# Patient Record
Sex: FEMALE | Race: WHITE | NOT HISPANIC OR LATINO | Employment: STUDENT | URBAN - METROPOLITAN AREA
[De-identification: names, ages, dates, MRNs, and addresses within clinical notes are randomized per-mention and may not be internally consistent; named-entity substitution may affect disease eponyms.]

---

## 2024-10-15 ENCOUNTER — TELEPHONE (OUTPATIENT)
Dept: OBSTETRICS AND GYNECOLOGY | Facility: CLINIC | Age: 24
End: 2024-10-15
Payer: COMMERCIAL

## 2024-10-15 ENCOUNTER — NURSE TRIAGE (OUTPATIENT)
Dept: ADMINISTRATIVE | Facility: CLINIC | Age: 24
End: 2024-10-15
Payer: COMMERCIAL

## 2024-10-15 ENCOUNTER — OFFICE VISIT (OUTPATIENT)
Dept: FAMILY MEDICINE | Facility: CLINIC | Age: 24
End: 2024-10-15
Payer: COMMERCIAL

## 2024-10-15 VITALS
TEMPERATURE: 99 F | OXYGEN SATURATION: 99 % | WEIGHT: 155.63 LBS | HEART RATE: 83 BPM | SYSTOLIC BLOOD PRESSURE: 100 MMHG | HEIGHT: 66 IN | DIASTOLIC BLOOD PRESSURE: 60 MMHG | BODY MASS INDEX: 25.01 KG/M2

## 2024-10-15 DIAGNOSIS — I82.512 CHRONIC EMBOLISM AND THROMBOSIS OF LEFT FEMORAL VEIN: ICD-10-CM

## 2024-10-15 DIAGNOSIS — N75.1 BARTHOLIN'S GLAND ABSCESS: Primary | ICD-10-CM

## 2024-10-15 PROCEDURE — 99214 OFFICE O/P EST MOD 30 MIN: CPT | Mod: S$GLB,,,

## 2024-10-15 PROCEDURE — 3008F BODY MASS INDEX DOCD: CPT | Mod: CPTII,S$GLB,,

## 2024-10-15 PROCEDURE — 99999 PR PBB SHADOW E&M-EST. PATIENT-LVL IV: CPT | Mod: PBBFAC,,,

## 2024-10-15 PROCEDURE — 3074F SYST BP LT 130 MM HG: CPT | Mod: CPTII,S$GLB,,

## 2024-10-15 PROCEDURE — 3078F DIAST BP <80 MM HG: CPT | Mod: CPTII,S$GLB,,

## 2024-10-15 PROCEDURE — 1159F MED LIST DOCD IN RCRD: CPT | Mod: CPTII,S$GLB,,

## 2024-10-15 PROCEDURE — 1160F RVW MEDS BY RX/DR IN RCRD: CPT | Mod: CPTII,S$GLB,,

## 2024-10-15 RX ORDER — TRAMADOL HYDROCHLORIDE 50 MG/1
50 TABLET ORAL EVERY 8 HOURS PRN
Qty: 21 TABLET | Refills: 0 | Status: SHIPPED | OUTPATIENT
Start: 2024-10-15 | End: 2024-10-22

## 2024-10-15 RX ORDER — SULFAMETHOXAZOLE AND TRIMETHOPRIM 800; 160 MG/1; MG/1
1 TABLET ORAL 2 TIMES DAILY
Qty: 14 TABLET | Refills: 0 | Status: SHIPPED | OUTPATIENT
Start: 2024-10-15 | End: 2024-10-22

## 2024-10-15 NOTE — TELEPHONE ENCOUNTER
Pt stated she has a cyst inside of her private area. Looks like a Red fluid filled bubble. Vaginal pain 8/10. Pt stated moderate-severe. Symptoms started Saturday. Care advice recommend pt see MD within 4 hours. Appt given.   Reason for Disposition   SEVERE pain (e.g., excruciating)    Additional Information   Negative: Patient sounds very sick or weak to the triager    Protocols used: Vulvar Symptoms-A-[place]

## 2024-10-15 NOTE — PROGRESS NOTES
Subjective     Patient ID: Sirena Menendez is a 24 y.o. female.    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 States she noticed a bump there on Saturday or Sunday and was hoping it would get better with home treatment of warm compresses and baths. States swelling and pain got worse yesterday and today, so much that it is difficult to walk or sit for any length of time. Has had a bartholin's cyst in the past but it never had to be drained and it never swelled up like this. Denies any fever/chills/fatigue, vaginal discharge, discharge from site or bleeding. Has been taking tylenol for pain with minimal relief.       Review of Systems   Constitutional:  Negative for chills, fatigue and fever.   HENT:  Negative for ear pain, sore throat and trouble swallowing.    Eyes:  Negative for pain and visual disturbance.   Respiratory:  Negative for cough, chest tightness and shortness of breath.    Cardiovascular:  Negative for chest pain, palpitations and leg swelling.   Gastrointestinal:  Negative for abdominal pain, constipation, nausea and vomiting.   Genitourinary:  Negative for bladder incontinence, difficulty urinating, dysuria, genital sores, hematuria, menstrual problem, pelvic pain and vaginal discharge.        Vulvar/labial pain   Musculoskeletal:  Negative for back pain.   Integumentary:  Negative for rash.   Neurological:  Negative for dizziness, weakness, numbness and headaches.   Hematological:  Negative for adenopathy.   Psychiatric/Behavioral:  Negative for confusion and hallucinations. The patient is not nervous/anxious.      Past Medical History:   Diagnosis Date    Factor V deficiency     History of blood clots     hx of factor V deficiency       Past Surgical History:   Procedure Laterality Date     "CHOLECYSTECTOMY         Family History   Problem Relation Name Age of Onset    Heart disease Mother Jody         Septal defect, needed surgery    Heart disease Father Aly         Arrhythmia    Deep vein thrombosis Father Aly     Thyroid disease Sister 1     No Known Problems Brother 1     Breast cancer Maternal Grandmother      Skin cancer Maternal Grandfather         Social History     Socioeconomic History    Marital status: Single   Tobacco Use    Smoking status: Never    Smokeless tobacco: Never   Substance and Sexual Activity    Alcohol use: No    Drug use: Never    Sexual activity: Not Currently     Social Drivers of Health     Stress: No Stress Concern Present (11/6/2019)    [employer] - Occupational Stress Questionnaire     Feeling of Stress : Not at all       Current Outpatient Medications   Medication Sig Dispense Refill    rivaroxaban (XARELTO) 10 mg Tab Take 10 mg by mouth daily with dinner or evening meal.      cholecalciferol, vitamin D3, (VITAMIN D3) 50 mcg (2,000 unit) Tab Take 1 tablet (2,000 Units total) by mouth once daily. (Patient not taking: Reported on 10/15/2024) 90 tablet 3    cyanocobalamin, vitamin B-12, 1,000 mcg Subl Place 1 tablet under the tongue Daily. (Patient not taking: Reported on 10/15/2024) 90 tablet 3    sulfamethoxazole-trimethoprim 800-160mg (BACTRIM DS) 800-160 mg Tab Take 1 tablet by mouth 2 (two) times daily. for 7 days 14 tablet 0    traMADoL (ULTRAM) 50 mg tablet Take 1 tablet (50 mg total) by mouth every 8 (eight) hours as needed for Pain (bartholin's abscess pain). 21 tablet 0     No current facility-administered medications for this visit.       Review of patient's allergies indicates:  No Known Allergies         Objective     Vitals:    10/15/24 1024   BP: 100/60   Pulse: 83   Temp: 98.6 °F (37 °C)   SpO2: 99%   Weight: 70.6 kg (155 lb 10.3 oz)   Height: 5' 6" (1.676 m)   PainSc:   8   PainLoc: Groin      Physical Exam  Vitals " reviewed.   Constitutional:       General: She is not in acute distress.     Appearance: Normal appearance. She is not ill-appearing.   HENT:      Head: Normocephalic and atraumatic.      Nose: Nose normal.      Mouth/Throat:      Mouth: Mucous membranes are moist.   Eyes:      General: No scleral icterus.        Right eye: No discharge.         Left eye: No discharge.      Extraocular Movements: Extraocular movements intact.      Conjunctiva/sclera: Conjunctivae normal.   Cardiovascular:      Rate and Rhythm: Normal rate and regular rhythm.   Pulmonary:      Effort: Pulmonary effort is normal. No respiratory distress.      Breath sounds: Normal breath sounds.   Abdominal:      General: Bowel sounds are normal.      Palpations: Abdomen is soft.      Hernia: There is no hernia in the right inguinal area.   Genitourinary:     Exam position: Supine.      Pubic Area: No rash.       Labia:         Right: Tenderness present.       Vagina: No vaginal discharge.          Comments: Bartholin's abscess noted to right side with labial swelling, redness, and tenderness. No drainage or open wound noted.   Musculoskeletal:         General: Normal range of motion.      Cervical back: Normal range of motion.   Lymphadenopathy:      Lower Body: No right inguinal adenopathy.   Skin:     General: Skin is warm and dry.   Neurological:      General: No focal deficit present.      Mental Status: She is alert and oriented to person, place, and time.   Psychiatric:         Mood and Affect: Mood normal.         Behavior: Behavior normal. Behavior is cooperative.         Cognition and Memory: Cognition and memory normal.              Assessment and Plan     1. Bartholin's gland abscess  - New problem, worse over last 4 days; hx factor V deficiency on Xarelto.  - Due to risk of bleeding complications, start on antibiotics and refer to OB/GYN for possible drainage or OR procedure.   -     sulfamethoxazole-trimethoprim 800-160mg (BACTRIM DS)  800-160 mg Tab; Take 1 tablet by mouth 2 (two) times daily. for 7 days  Dispense: 14 tablet; Refill: 0  -     Ambulatory referral/consult to Obstetrics / Gynecology; Future; Expected date: 10/22/2024  -     traMADoL (ULTRAM) 50 mg tablet; Take 1 tablet (50 mg total) by mouth every 8 (eight) hours as needed for Pain (bartholin's abscess pain).  Dispense: 21 tablet; Refill: 0  - Take medications as prescribed; Medication precautions discussed. Continue warm compresses and baths as needed. If site opens, only do warm showers- do not submerge in tub water.  - Follow up if s/s worsen; Follow up with OB/GYN; establish care with PCP and GYN.     2. 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  -Taking Xarelto.            Follow up if symptoms worsen or fail to improve.  Follow up with OB/GYN.         35 minutes of total time spent on the encounter, which includes face to face time and non-face to face time preparing to see the patient (eg, review of tests), Obtaining and/or reviewing separately obtained history, Documenting clinical information in the electronic or other health record, Independently interpreting results (not separately reported) and communicating results to the patient/family/caregiver, or Care coordination (not separately reported).      Caty Mora, MSN, APRN, FNP-C  Family Medicine  Office #[phone]

## 2024-10-15 NOTE — TELEPHONE ENCOUNTER
----- Message from Lindsey sent at 10/15/2024  4:11 PM CDT -----  Regarding: appt question  Name of Who is Calling:  pt        What is the request in detail: pt has appt on 10/21 for barholin cyst. She is on blood thinners and is asking if she needs to stop taking the medication before her upcoming appt. Please call to advise.         [facility] [phone]

## 2024-10-16 ENCOUNTER — TELEPHONE (OUTPATIENT)
Dept: OBSTETRICS AND GYNECOLOGY | Facility: CLINIC | Age: 24
End: 2024-10-16
Payer: COMMERCIAL

## 2024-10-16 ENCOUNTER — PATIENT MESSAGE (OUTPATIENT)
Dept: PREADMISSION TESTING | Facility: OTHER | Age: 24
End: 2024-10-16
Payer: COMMERCIAL

## 2024-10-16 ENCOUNTER — TELEPHONE (OUTPATIENT)
Dept: OBSTETRICS AND GYNECOLOGY | Facility: CLINIC | Age: 24
End: 2024-10-16

## 2024-10-16 ENCOUNTER — ANESTHESIA EVENT (OUTPATIENT)
Dept: SURGERY | Facility: OTHER | Age: 24
End: 2024-10-16
Payer: COMMERCIAL

## 2024-10-16 ENCOUNTER — OFFICE VISIT (OUTPATIENT)
Dept: OBSTETRICS AND GYNECOLOGY | Facility: CLINIC | Age: 24
End: 2024-10-16
Payer: COMMERCIAL

## 2024-10-16 VITALS — SYSTOLIC BLOOD PRESSURE: 121 MMHG | WEIGHT: 154 LBS | DIASTOLIC BLOOD PRESSURE: 68 MMHG | BODY MASS INDEX: 24.86 KG/M2

## 2024-10-16 DIAGNOSIS — N75.0 BARTHOLIN CYST: Primary | ICD-10-CM

## 2024-10-16 PROCEDURE — 99999 PR PBB SHADOW E&M-EST. PATIENT-LVL III: CPT | Mod: PBBFAC,,, | Performed by: OBSTETRICS & GYNECOLOGY

## 2024-10-16 PROCEDURE — 3008F BODY MASS INDEX DOCD: CPT | Mod: CPTII,S$GLB,, | Performed by: OBSTETRICS & GYNECOLOGY

## 2024-10-16 PROCEDURE — 3078F DIAST BP <80 MM HG: CPT | Mod: CPTII,S$GLB,, | Performed by: OBSTETRICS & GYNECOLOGY

## 2024-10-16 PROCEDURE — 1159F MED LIST DOCD IN RCRD: CPT | Mod: CPTII,S$GLB,, | Performed by: OBSTETRICS & GYNECOLOGY

## 2024-10-16 PROCEDURE — 3074F SYST BP LT 130 MM HG: CPT | Mod: CPTII,S$GLB,, | Performed by: OBSTETRICS & GYNECOLOGY

## 2024-10-16 PROCEDURE — 99213 OFFICE O/P EST LOW 20 MIN: CPT | Mod: S$GLB,,, | Performed by: OBSTETRICS & GYNECOLOGY

## 2024-10-16 RX ORDER — MUPIROCIN 20 MG/G
OINTMENT TOPICAL 3 TIMES DAILY
Qty: 22 G | Refills: 0 | Status: SHIPPED | OUTPATIENT
Start: 2024-10-16

## 2024-10-16 RX ORDER — OXYCODONE AND ACETAMINOPHEN 5; 325 MG/1; MG/1
1 TABLET ORAL EVERY 4 HOURS PRN
Qty: 10 TABLET | Refills: 0 | Status: ON HOLD | OUTPATIENT
Start: 2024-10-16 | End: 2024-10-18

## 2024-10-16 NOTE — TELEPHONE ENCOUNTER
I called pt to update her on her new arrival time for surgery this Friday. Pt did not answer, but I did leave her a VM letting her know to arrive for 6:30 am.

## 2024-10-16 NOTE — TELEPHONE ENCOUNTER
I called pt back again to go over the new arrival time of 6:30 am. Pt did not answer but I left her a VM again letting her know.

## 2024-10-16 NOTE — H&P (VIEW-ONLY)
HISTORY OF PRESENT ILLNESS:    Sirena Menendez is a 24 y.o. female, , Patient's last menstrual period was 10/09/2024 (approximate).,  presents for  .   Last pap:  never   Contraception: none.    Sexually transmitted infection risk: very low risk of STD exposure.   Menstrual flow: regular every 28-30 days.  This is the extent of the patient's complaints at this time.     Past Medical History:   Diagnosis Date    Factor V deficiency     History of blood clots     hx of factor V deficiency       Past Surgical History:   Procedure Laterality Date    CHOLECYSTECTOMY         MEDICATIONS AND ALLERGIES:      Current Outpatient Medications:     rivaroxaban (XARELTO) 10 mg Tab, Take 10 mg by mouth daily with dinner or evening meal., Disp: , Rfl:     sulfamethoxazole-trimethoprim 800-160mg (BACTRIM DS) 800-160 mg Tab, Take 1 tablet by mouth 2 (two) times daily. for 7 days, Disp: 14 tablet, Rfl: 0    traMADoL (ULTRAM) 50 mg tablet, Take 1 tablet (50 mg total) by mouth every 8 (eight) hours as needed for Pain (bartholin's abscess pain)., Disp: 21 tablet, Rfl: 0    Review of patient's allergies indicates:  No Known Allergies    Family History   Problem Relation Name Age of Onset    Heart disease Mother Jody         Septal defect, needed surgery    Heart disease Father Aly         Arrhythmia    Deep vein thrombosis Father Aly     Thyroid disease Sister 1     No Known Problems Brother 1     Breast cancer Maternal Grandmother      Skin cancer Maternal Grandfather         Social History     Socioeconomic History    Marital status: Single   Tobacco Use    Smoking status: Never    Smokeless tobacco: Never   Substance and Sexual Activity    Alcohol use: No    Drug use: Never    Sexual activity: Not Currently     Social Drivers of Health     Stress: No Stress Concern Present (2019)    [employer] - Occupational Stress Questionnaire     Feeling of Stress : Not at all       OB History    Para  Term  AB Living   0 0 0 0 0 0   SAB IAB Ectopic Multiple Live Births   0 0 0 0 0          COMPREHENSIVE GYN HISTORY:  PAP History: Denies abnormal Paps.  Infection History: Denies STDs. Denies PID.  Benign History: Denies uterine fibroids. Denies ovarian cysts. Denies endometriosis. Denies other conditions.  Cancer History: Denies cervical cancer. Denies uterine cancer or hyperplasia. Denies ovarian cancer. Denies vulvar cancer or pre-cancer. Denies vaginal cancer or pre-cancer. Denies breast cancer. Denies colon cancer.      ROS:  GENERAL: No weight changes. No swelling. No fatigue. No fever.  BREASTS: No pain. No lumps. No discharge.  ABDOMEN: No pain. No nausea. No vomiting. No diarrhea. No constipation.  REPRODUCTIVE: No abnormal bleeding. No pelvic pain.   VULVA: No pain. No lesions. No itching.  VAGINA: No relaxation. No itching. No odor. No discharge. No lesions.  URINARY: No incontinence. No nocturia. No frequency. No dysuria.    /68 (Patient Position: Sitting)   Wt 69.9 kg (154 lb)   LMP 10/09/2024 (Approximate)   BMI 24.86 kg/m²     PE:  Physical Exam   Right bartholin abscess  PROCEDURES/ORDERS:    Assessment/Plan:    Bartholin cyst  -     Case Request Operating Room: MARSUPIALIZATION, CYST, BARTHOLIN'S GLAND        Orders Placed This Encounter    Case Request Operating Room: MARSUPIALIZATION, CYST, BARTHOLIN'S GLAND        COUNSELING:  The patient was counseled today on:  -A.C.S. Pap and pelvic exam guidelines, recomendations for yearly mammogram, monthly self breast exams and to follow up with her PCP for other health maintenance.    FOLLOW-UP  annually for [employer].

## 2024-10-16 NOTE — PROGRESS NOTES
HISTORY OF PRESENT ILLNESS:    Sirena Menendez is a 24 y.o. female, , Patient's last menstrual period was 10/09/2024 (approximate).,  presents for  .   Last pap:  never   Contraception: none.    Sexually transmitted infection risk: very low risk of STD exposure.   Menstrual flow: regular every 28-30 days.  This is the extent of the patient's complaints at this time.     Past Medical History:   Diagnosis Date    Factor V deficiency     History of blood clots     hx of factor V deficiency       Past Surgical History:   Procedure Laterality Date    CHOLECYSTECTOMY         MEDICATIONS AND ALLERGIES:      Current Outpatient Medications:     rivaroxaban (XARELTO) 10 mg Tab, Take 10 mg by mouth daily with dinner or evening meal., Disp: , Rfl:     sulfamethoxazole-trimethoprim 800-160mg (BACTRIM DS) 800-160 mg Tab, Take 1 tablet by mouth 2 (two) times daily. for 7 days, Disp: 14 tablet, Rfl: 0    traMADoL (ULTRAM) 50 mg tablet, Take 1 tablet (50 mg total) by mouth every 8 (eight) hours as needed for Pain (bartholin's abscess pain)., Disp: 21 tablet, Rfl: 0    Review of patient's allergies indicates:  No Known Allergies    Family History   Problem Relation Name Age of Onset    Heart disease Mother Jody         Septal defect, needed surgery    Heart disease Father Aly         Arrhythmia    Deep vein thrombosis Father Aly     Thyroid disease Sister 1     No Known Problems Brother 1     Breast cancer Maternal Grandmother      Skin cancer Maternal Grandfather         Social History     Socioeconomic History    Marital status: Single   Tobacco Use    Smoking status: Never    Smokeless tobacco: Never   Substance and Sexual Activity    Alcohol use: No    Drug use: Never    Sexual activity: Not Currently     Social Drivers of Health     Stress: No Stress Concern Present (2019)    [employer] - Occupational Stress Questionnaire     Feeling of Stress : Not at all       OB History    Para  Term  AB Living   0 0 0 0 0 0   SAB IAB Ectopic Multiple Live Births   0 0 0 0 0          COMPREHENSIVE GYN HISTORY:  PAP History: Denies abnormal Paps.  Infection History: Denies STDs. Denies PID.  Benign History: Denies uterine fibroids. Denies ovarian cysts. Denies endometriosis. Denies other conditions.  Cancer History: Denies cervical cancer. Denies uterine cancer or hyperplasia. Denies ovarian cancer. Denies vulvar cancer or pre-cancer. Denies vaginal cancer or pre-cancer. Denies breast cancer. Denies colon cancer.      ROS:  GENERAL: No weight changes. No swelling. No fatigue. No fever.  BREASTS: No pain. No lumps. No discharge.  ABDOMEN: No pain. No nausea. No vomiting. No diarrhea. No constipation.  REPRODUCTIVE: No abnormal bleeding. No pelvic pain.   VULVA: No pain. No lesions. No itching.  VAGINA: No relaxation. No itching. No odor. No discharge. No lesions.  URINARY: No incontinence. No nocturia. No frequency. No dysuria.    /68 (Patient Position: Sitting)   Wt 69.9 kg (154 lb)   LMP 10/09/2024 (Approximate)   BMI 24.86 kg/m²     PE:  Physical Exam   Right bartholin abscess  PROCEDURES/ORDERS:    Assessment/Plan:    Bartholin cyst  -     Case Request Operating Room: MARSUPIALIZATION, CYST, BARTHOLIN'S GLAND        Orders Placed This Encounter    Case Request Operating Room: MARSUPIALIZATION, CYST, BARTHOLIN'S GLAND        COUNSELING:  The patient was counseled today on:  -A.C.S. Pap and pelvic exam guidelines, recomendations for yearly mammogram, monthly self breast exams and to follow up with her PCP for other health maintenance.    FOLLOW-UP  annually for [employer].

## 2024-10-16 NOTE — LETTER
October 16, 2024      [facility]  [address] [address]  Phone: [phone]  Fax: [phone]       Patient: Sirena Menendez   YOB: 2000  Date of Visit: 10/16/2024    To Whom It May Concern:    Sydni Menendez  was at [facility] on 10/16/2024. The patient may return to work on 10/21/2024 with no restrictions. If you have any questions or concerns, or if I can be of further assistance, please do not hesitate to contact me.    Sincerely,    Dr. Tolentino

## 2024-10-17 ENCOUNTER — TELEPHONE (OUTPATIENT)
Dept: OBSTETRICS AND GYNECOLOGY | Facility: CLINIC | Age: 24
End: 2024-10-17
Payer: COMMERCIAL

## 2024-10-17 NOTE — TELEPHONE ENCOUNTER
I called pt and left a VM for her to contact pre-admit I did provide pt with the number at her appointment.

## 2024-10-18 ENCOUNTER — ANESTHESIA (OUTPATIENT)
Dept: SURGERY | Facility: OTHER | Age: 24
End: 2024-10-18
Payer: COMMERCIAL

## 2024-10-18 ENCOUNTER — HOSPITAL ENCOUNTER (OUTPATIENT)
Facility: OTHER | Age: 24
Discharge: HOME | End: 2024-10-18
Attending: OBSTETRICS & GYNECOLOGY | Admitting: OBSTETRICS & GYNECOLOGY
Payer: COMMERCIAL

## 2024-10-18 VITALS
TEMPERATURE: 99 F | HEIGHT: 66 IN | DIASTOLIC BLOOD PRESSURE: 72 MMHG | HEART RATE: 58 BPM | WEIGHT: 154.13 LBS | SYSTOLIC BLOOD PRESSURE: 110 MMHG | RESPIRATION RATE: 16 BRPM | BODY MASS INDEX: 24.77 KG/M2 | OXYGEN SATURATION: 100 %

## 2024-10-18 DIAGNOSIS — Z87.42: Primary | ICD-10-CM

## 2024-10-18 DIAGNOSIS — N75.1 BARTHOLIN'S GLAND ABSCESS: ICD-10-CM

## 2024-10-18 DIAGNOSIS — N75.0 BARTHOLIN'S CYST: ICD-10-CM

## 2024-10-18 DIAGNOSIS — Z98.890: Primary | ICD-10-CM

## 2024-10-18 DIAGNOSIS — N75.0 BARTHOLIN CYST: ICD-10-CM

## 2024-10-18 LAB
B-HCG UR QL: NEGATIVE
CTP QC/QA: YES

## 2024-10-18 PROCEDURE — 63600175 PHARM REV CODE 636 W HCPCS: Performed by: ANESTHESIOLOGY

## 2024-10-18 PROCEDURE — 88304 TISSUE EXAM BY PATHOLOGIST: CPT | Performed by: PATHOLOGY

## 2024-10-18 PROCEDURE — 37000009 HC ANESTHESIA EA ADD 15 MINS: Performed by: OBSTETRICS & GYNECOLOGY

## 2024-10-18 PROCEDURE — 71000039 HC RECOVERY, EACH ADD'L HOUR: Performed by: OBSTETRICS & GYNECOLOGY

## 2024-10-18 PROCEDURE — 71000033 HC RECOVERY, INTIAL HOUR: Performed by: OBSTETRICS & GYNECOLOGY

## 2024-10-18 PROCEDURE — 63600175 PHARM REV CODE 636 W HCPCS: Performed by: STUDENT IN AN ORGANIZED HEALTH CARE EDUCATION/TRAINING PROGRAM

## 2024-10-18 PROCEDURE — 36000706: Performed by: OBSTETRICS & GYNECOLOGY

## 2024-10-18 PROCEDURE — 63600175 PHARM REV CODE 636 W HCPCS

## 2024-10-18 PROCEDURE — 56440 MRSPLZATN BRTHLNS GLND CST: CPT | Mod: ,,, | Performed by: OBSTETRICS & GYNECOLOGY

## 2024-10-18 PROCEDURE — 37000008 HC ANESTHESIA 1ST 15 MINUTES: Performed by: OBSTETRICS & GYNECOLOGY

## 2024-10-18 PROCEDURE — 81025 URINE PREGNANCY TEST: CPT | Performed by: OBSTETRICS & GYNECOLOGY

## 2024-10-18 PROCEDURE — 71000015 HC POSTOP RECOV 1ST HR: Performed by: OBSTETRICS & GYNECOLOGY

## 2024-10-18 PROCEDURE — 25000003 PHARM REV CODE 250: Performed by: OBSTETRICS & GYNECOLOGY

## 2024-10-18 PROCEDURE — 71000016 HC POSTOP RECOV ADDL HR: Performed by: OBSTETRICS & GYNECOLOGY

## 2024-10-18 PROCEDURE — 36000707: Performed by: OBSTETRICS & GYNECOLOGY

## 2024-10-18 PROCEDURE — 25000003 PHARM REV CODE 250: Performed by: ANESTHESIOLOGY

## 2024-10-18 RX ORDER — PROPOFOL 10 MG/ML
VIAL (ML) INTRAVENOUS
Status: DISCONTINUED | OUTPATIENT
Start: 2024-10-18 | End: 2024-10-18

## 2024-10-18 RX ORDER — OXYCODONE HYDROCHLORIDE 5 MG/1
5 TABLET ORAL EVERY 4 HOURS PRN
Status: CANCELLED | OUTPATIENT
Start: 2024-10-18

## 2024-10-18 RX ORDER — HYDROMORPHONE HYDROCHLORIDE 2 MG/ML
0.2 INJECTION, SOLUTION INTRAMUSCULAR; INTRAVENOUS; SUBCUTANEOUS
Status: CANCELLED | OUTPATIENT
Start: 2024-10-18

## 2024-10-18 RX ORDER — LIDOCAINE HYDROCHLORIDE 20 MG/ML
INJECTION INTRAVENOUS
Status: DISCONTINUED | OUTPATIENT
Start: 2024-10-18 | End: 2024-10-18

## 2024-10-18 RX ORDER — OXYCODONE AND ACETAMINOPHEN 5; 325 MG/1; MG/1
1 TABLET ORAL EVERY 4 HOURS PRN
Qty: 10 TABLET | Refills: 0 | Status: SHIPPED | OUTPATIENT
Start: 2024-10-18

## 2024-10-18 RX ORDER — ENOXAPARIN SODIUM 100 MG/ML
30 INJECTION SUBCUTANEOUS
Status: COMPLETED | OUTPATIENT
Start: 2024-10-18 | End: 2024-10-18

## 2024-10-18 RX ORDER — SODIUM CHLORIDE 0.9 % (FLUSH) 0.9 %
3 SYRINGE (ML) INJECTION
Status: DISCONTINUED | OUTPATIENT
Start: 2024-10-18 | End: 2024-10-18 | Stop reason: HOSPADM

## 2024-10-18 RX ORDER — DIPHENHYDRAMINE HYDROCHLORIDE 50 MG/ML
25 INJECTION INTRAMUSCULAR; INTRAVENOUS EVERY 6 HOURS PRN
Status: DISCONTINUED | OUTPATIENT
Start: 2024-10-18 | End: 2024-10-18 | Stop reason: HOSPADM

## 2024-10-18 RX ORDER — FAMOTIDINE 20 MG/1
20 TABLET, FILM COATED ORAL
Status: COMPLETED | OUTPATIENT
Start: 2024-10-18 | End: 2024-10-18

## 2024-10-18 RX ORDER — PROCHLORPERAZINE EDISYLATE 5 MG/ML
5 INJECTION INTRAMUSCULAR; INTRAVENOUS EVERY 30 MIN PRN
Status: DISCONTINUED | OUTPATIENT
Start: 2024-10-18 | End: 2024-10-18 | Stop reason: HOSPADM

## 2024-10-18 RX ORDER — GLUCAGON 1 MG
1 KIT INJECTION
Status: DISCONTINUED | OUTPATIENT
Start: 2024-10-18 | End: 2024-10-18 | Stop reason: HOSPADM

## 2024-10-18 RX ORDER — HYDROMORPHONE HYDROCHLORIDE 2 MG/ML
0.4 INJECTION, SOLUTION INTRAMUSCULAR; INTRAVENOUS; SUBCUTANEOUS EVERY 5 MIN PRN
Status: DISCONTINUED | OUTPATIENT
Start: 2024-10-18 | End: 2024-10-18 | Stop reason: HOSPADM

## 2024-10-18 RX ORDER — DEXAMETHASONE SODIUM PHOSPHATE 4 MG/ML
INJECTION, SOLUTION INTRA-ARTICULAR; INTRALESIONAL; INTRAMUSCULAR; INTRAVENOUS; SOFT TISSUE
Status: DISCONTINUED | OUTPATIENT
Start: 2024-10-18 | End: 2024-10-18

## 2024-10-18 RX ORDER — FENTANYL CITRATE 50 UG/ML
INJECTION, SOLUTION INTRAMUSCULAR; INTRAVENOUS
Status: DISCONTINUED | OUTPATIENT
Start: 2024-10-18 | End: 2024-10-18

## 2024-10-18 RX ORDER — MEPERIDINE HYDROCHLORIDE 25 MG/ML
12.5 INJECTION INTRAMUSCULAR; INTRAVENOUS; SUBCUTANEOUS ONCE AS NEEDED
Status: DISCONTINUED | OUTPATIENT
Start: 2024-10-18 | End: 2024-10-18 | Stop reason: HOSPADM

## 2024-10-18 RX ORDER — ONDANSETRON HYDROCHLORIDE 2 MG/ML
INJECTION, SOLUTION INTRAVENOUS
Status: DISCONTINUED | OUTPATIENT
Start: 2024-10-18 | End: 2024-10-18

## 2024-10-18 RX ORDER — ONDANSETRON HYDROCHLORIDE 2 MG/ML
4 INJECTION, SOLUTION INTRAVENOUS EVERY 4 HOURS PRN
Status: CANCELLED | OUTPATIENT
Start: 2024-10-18

## 2024-10-18 RX ORDER — ACETAMINOPHEN 500 MG
1000 TABLET ORAL EVERY 6 HOURS PRN
Status: CANCELLED | OUTPATIENT
Start: 2024-10-18

## 2024-10-18 RX ORDER — MIDAZOLAM HYDROCHLORIDE 1 MG/ML
INJECTION INTRAMUSCULAR; INTRAVENOUS
Status: DISCONTINUED | OUTPATIENT
Start: 2024-10-18 | End: 2024-10-18

## 2024-10-18 RX ORDER — OXYCODONE HYDROCHLORIDE 5 MG/1
5 TABLET ORAL
Status: DISCONTINUED | OUTPATIENT
Start: 2024-10-18 | End: 2024-10-18 | Stop reason: HOSPADM

## 2024-10-18 RX ORDER — MUPIROCIN 20 MG/G
OINTMENT TOPICAL
Status: DISCONTINUED | OUTPATIENT
Start: 2024-10-18 | End: 2024-10-18 | Stop reason: HOSPADM

## 2024-10-18 RX ORDER — SODIUM CHLORIDE 9 MG/ML
INJECTION, SOLUTION INTRAVENOUS CONTINUOUS
Status: DISCONTINUED | OUTPATIENT
Start: 2024-10-18 | End: 2024-10-18 | Stop reason: HOSPADM

## 2024-10-18 RX ADMIN — PROPOFOL 50 MG: 10 INJECTION, EMULSION INTRAVENOUS at 07:10

## 2024-10-18 RX ADMIN — PROPOFOL 30 MG: 10 INJECTION, EMULSION INTRAVENOUS at 07:10

## 2024-10-18 RX ADMIN — FENTANYL CITRATE 100 MCG: 50 INJECTION, SOLUTION INTRAMUSCULAR; INTRAVENOUS at 07:10

## 2024-10-18 RX ADMIN — MUPIROCIN: 20 OINTMENT TOPICAL at 06:10

## 2024-10-18 RX ADMIN — LIDOCAINE HYDROCHLORIDE 80 MG: 20 INJECTION, SOLUTION INTRAVENOUS at 07:10

## 2024-10-18 RX ADMIN — HYDROMORPHONE HYDROCHLORIDE 0.4 MG: 2 INJECTION, SOLUTION INTRAMUSCULAR; INTRAVENOUS; SUBCUTANEOUS at 08:10

## 2024-10-18 RX ADMIN — ONDANSETRON HYDROCHLORIDE 4 MG: 2 INJECTION INTRAMUSCULAR; INTRAVENOUS at 07:10

## 2024-10-18 RX ADMIN — SODIUM CHLORIDE: 0.9 INJECTION, SOLUTION INTRAVENOUS at 07:10

## 2024-10-18 RX ADMIN — GLYCOPYRROLATE 0.2 MG: 0.2 INJECTION, SOLUTION INTRAMUSCULAR; INTRAVITREAL at 07:10

## 2024-10-18 RX ADMIN — FAMOTIDINE 20 MG: 20 TABLET, FILM COATED ORAL at 06:10

## 2024-10-18 RX ADMIN — ENOXAPARIN SODIUM 30 MG: 30 INJECTION SUBCUTANEOUS at 06:10

## 2024-10-18 RX ADMIN — PROPOFOL 170 MG: 10 INJECTION, EMULSION INTRAVENOUS at 07:10

## 2024-10-18 RX ADMIN — MIDAZOLAM HYDROCHLORIDE 2 MG: 1 INJECTION, SOLUTION INTRAMUSCULAR; INTRAVENOUS at 07:10

## 2024-10-18 RX ADMIN — DEXAMETHASONE SODIUM PHOSPHATE 8 MG: 4 INJECTION, SOLUTION INTRAMUSCULAR; INTRAVENOUS at 07:10

## 2024-10-18 RX ADMIN — OXYCODONE HYDROCHLORIDE 5 MG: 5 TABLET ORAL at 08:10

## 2024-10-18 NOTE — TRANSFER OF CARE
Anesthesia Transfer of Care Note    Patient: Sirena Menendez    Procedure(s) Performed: Procedure(s) (LRB):  MARSUPIALIZATION, CYST, BARTHOLIN'S GLAND (Right)    Patient location: PACU    Anesthesia Type: general    Transport from OR: Transported from OR on room air with adequate spontaneous ventilation    Post pain: adequate analgesia    Post assessment: no apparent anesthetic complications    Post vital signs: stable    Level of consciousness: awake    Nausea/Vomiting: no nausea/vomiting    Complications: none    Transfer of care protocol was followed

## 2024-10-18 NOTE — PLAN OF CARE
Patient requests the presence of her mother and father at the time of discharge and for transportation to the home setting.

## 2024-10-18 NOTE — ANESTHESIA PROCEDURE NOTES
Intubation    Date/Time: 10/18/2024 7:13 AM    Performed by: Fide Webb CRNA  Authorized by: Winston Yao MD    Intubation:     Induction:  Intravenous    Intubated:  Postinduction    Mask Ventilation:  Not attempted    Attempts:  1    Attempted By:  CRNA    Difficult Airway Encountered?: No      Complications:  None    Airway Device:  Supraglottic airway/LMA    Airway Device Size:  4.0    Secured at:  The lips    Placement Verified By:  Capnometry    Complicating Factors:  None    Findings Post-Intubation:  BS equal bilateral and atraumatic/condition of teeth unchanged

## 2024-10-18 NOTE — DISCHARGE SUMMARY
"Discharge Summary  Gynecology      Admit Date: 10/18/2024    Discharge Date and Time: 10/18/2024     Attending Physician: Andrew Tolentino MD    Principal Diagnoses: History of surgical removal of Bartholins gland cyst    Active Hospital Problems    Diagnosis  POA    *History of marsupialization of Bartholins gland cyst [Z98.890, Z87.42]  Not Applicable      Resolved Hospital Problems   No resolved problems to display.       Procedures: Procedure(s) (LRB):  MARSUPIALIZATION, CYST, BARTHOLIN'S GLAND (Right)    Discharged Condition: stable    Hospital Course:   Sirena Menendez is a 24 y.o. y.o.  female who presented on 10/18/2024   for above procedures for the treatment of bartholin cyst. Patient tolerated procedure. Post-operative course was uncomplicated.  On day of discharge, patient was urinating, ambulating, and tolerating a regular diet without difficulty. Pain was well controlled on PO medication. She was discharged home on POD#0 in stable condition with instructions to follow up with Dr. Tolentino in 6 weeks.     Consults: None    Significant Diagnostic Studies:  No results for input(s): "WBC", "HGB", "HCT", "MCV", "PLT" in the last 168 hours.     Treatments:   1. Surgery as above    Disposition: Home or Self Care    Patient Instructions:   Current Discharge Medication List        CONTINUE these medications which have CHANGED    Details   oxyCODONE-acetaminophen (PERCOCET) 5-325 mg per tablet Take 1 tablet by mouth every 4 (four) hours as needed for Pain.  Qty: 10 tablet, Refills: 0    Comments: Quantity prescribed more than 7 day supply? Yes, quantity medically necessary  Associated Diagnoses: Bartholin cyst           CONTINUE these medications which have NOT CHANGED    Details   acetaminophen (TYLENOL ORAL) Take by mouth as needed.      mupirocin (BACTROBAN) 2 % ointment Apply topically 3 (three) times daily.  Qty: 22 g, Refills: 0    Associated Diagnoses: Bartholin cyst      rivaroxaban (XARELTO) " 10 mg Tab Take 10 mg by mouth daily with dinner or evening meal.    Associated Diagnoses: Factor V deficiency; Current long-term use of anticoagulant medication with history of deep venous thrombosis (DVT)      sulfamethoxazole-trimethoprim 800-160mg (BACTRIM DS) 800-160 mg Tab Take 1 tablet by mouth 2 (two) times daily. for 7 days  Qty: 14 tablet, Refills: 0    Associated Diagnoses: Bartholin's gland abscess      traMADoL (ULTRAM) 50 mg tablet Take 1 tablet (50 mg total) by mouth every 8 (eight) hours as needed for Pain (bartholin's abscess pain).  Qty: 21 tablet, Refills: 0    Comments: Quantity prescribed more than 7 day supply? No  Associated Diagnoses: Bartholin's gland abscess             Discharge Procedure Orders   Diet general     Lifting restrictions   Order Comments: LIFTING:  No lifting greater than 15 lb for 6 weeks.    PELVIC REST:  No douching, tampons, or intercourse for 6 weeks.    If prescribed, vaginal estrogen cream may be used during the postoperative period.     DRIVING:  No driving while on narcotics. Driving may be resumed initially with a competent passenger one to two weeks after surgery if no longer taking narcotics.     Other restrictions (specify):   Order Comments: No alcoholic beverages while taking narcotic pain medications.  Do not drive while taking narcotic pain medication.    No douching, tampons, or intercourse for six weeks if you had any kind of procedure performed in the outside or inside of your vagina.      Avoid straining with bowel movements and/or constipation.     Wound care routine (specify)   Order Comments: VULVAR CARE:  After urinating or having a bowel movement, do the following to clean and dry the area of your incisions   - Use a moistened wipe, making sure to wipe front-to-back  - Use a small squirt bottle with lukewarm water to rinse the area  - Use a clean, soft towel to pat dry the area  - Use a blow dryer on a cool setting to dry the incisions     You may  shower or take a tub bath following surgery   - When taking a tub bath to help relieve discomfort or to clean the area, use clear, warm water  - Do not use bubble bath or bath oil   - After bathing, use a clean, soft towel to pat dry the area   - Then use a blow dryer on a cool setting to dry the incisions    -Avoid wearing tight-fitting clothing over your incisions.  -You may use an ice pack on your vulva for up to 10 minutes at a time to help with swelling. Avoid placing ice directly on your skin.   -You may soak in a bathtub 4-6 times per day (10-15 minutes each time) to help with your vulvar discomfort and swelling.   -Stitches may have been placed at the time of surgery. These stitches are all dissolvable. As they begin to dissolve, you may notice yellow to white discharge, this is normal.    DRAIN CARE (IF YOU HAVE A DRAIN):  1. Keep the area where the tube exits the skin clean and dry. You may clean with soap and water on a daily basis.  2. Empty the container when it becomes half full. The container will not create suction if it becomes too full. To empty the container (or bulb):  - Open the stopper to release the suction.  - Holding the stopper out of the way, pour the fluid into the measuring cup provided to you.  - Measure and record the amount. If you have multiple drains, please record these amounts separately. Total the amount of fluid you empty in each 24 hour period.   - Compress the container and replace the stopper. Keep the container compressed at all times except when emptying it. The compression creates the gentle suction.   3. Strip drains 3-4 times daily as instructed.   4. Goal drainage output is less than 20 cc per day for at least 2 consecutive days.   5. Call your surgical team weekly with your drain output. Bring this record to your next appointment with your physician if the drain is still in place at that time.  6. Contact your surgical team if you noticed any of the followin) your  drain output decreases rapidly. 2) You develop fever/chills. 3) You have pain, redness, swelling, or heat around the drain site.    DRESSING CARE(IF YOU HAVE A DRESSING): If you have a bandage on wound, you may remove it the day after dismissal.  If you had steri-strips remove them once they begin to peel off (usually 2 weeks).  If they still haven't peeled off in 2 weeks, please peel them off.     Call MD for:  temperature >100.4     Call MD for:  persistent nausea and vomiting     Call MD for:  severe uncontrolled pain     Call MD for:  difficulty breathing, headache or visual disturbances     Call MD for:  redness, tenderness, or signs of infection (pain, swelling, redness, odor or green/yellow discharge around incision site)     Call MD for:  hives     Call MD for:   Order Comments: inability to void,urine is ketchup colored or you have large clots, vaginal bleeding is heavier than a period.    VAGINAL DISCHARGE: You may develop a vaginal discharge and intermittent vaginal spotting after surgery and up to 6 weeks postoperatively.  The discharge may have an odor and may change in color but it is normal.  This is due to dissolving stiches.  Contact your surgical team if you develop vaginal or vulvar irritation along with a discharge.  Also contact your surgical team if you have vaginal discharge that smells like urine or stool.     Activity as tolerated   Order Comments: Return to normal activity slowly as you feel able.  For 6 weeks your exercise should be limited to walking.  You may walk as far as you wish, as long as you increase your level of exertion gradually and avoid slippery surfaces.        Follow-up Information       Andrew Tolentino MD. Schedule an appointment as soon as possible for a visit in 6 week(s).    Specialties: Obstetrics, Obstetrics and Gynecology  Why: Post-op  Contact information:  [id number]  [facility] [id number]  [phone]                            Ambreen Pritchett  MD  Obstetrics & Gynecology, PGY-1

## 2024-10-18 NOTE — PLAN OF CARE
Sirena Aly Menendez has met all discharge criteria from Phase II. Vital Signs are stable, ambulating  without difficulty. Discharge instructions given, patient verbalized understanding. Discharged from facility via wheelchair in stable condition.

## 2024-10-18 NOTE — ANESTHESIA PREPROCEDURE EVALUATION
10/18/2024  Sirena Menendez is a 24 y.o., female.      Pre-op Assessment    I have reviewed the Patient Summary Reports.     I have reviewed the Nursing Notes.    I have reviewed the Medications.     Review of Systems  Anesthesia Hx:  No problems with previous Anesthesia             Denies Family Hx of Anesthesia complications.    Denies Personal Hx of Anesthesia complications.                    Social:  Non-Smoker       Hematology/Oncology:  Hematology Normal   Oncology Normal                Hematology Comments: Factor v def                    EENT/Dental:  EENT/Dental Normal           Cardiovascular:  Cardiovascular Normal                   DVT's                           Pulmonary:  Pulmonary Normal                       Renal/:  Renal/ Normal                 Hepatic/GI:  Hepatic/GI Normal                    Musculoskeletal:  Musculoskeletal Normal                Neurological:  Neurology Normal                                      Endocrine:  Endocrine Normal            Dermatological:  Skin Normal    Psych:  Psychiatric Normal                    Physical Exam  General: Well nourished and Alert    Airway:  Mallampati: II   Mouth Opening: Normal  Tongue: Normal    Dental:  Intact        Anesthesia Plan  Type of Anesthesia, risks & benefits discussed:    Anesthesia Type: Gen Supraglottic Airway  Intra-op Monitoring Plan: Standard ASA Monitors  Post Op Pain Control Plan: multimodal analgesia  Induction:  IV  Informed Consent: Informed consent signed with the Patient and all parties understand the risks and agree with anesthesia plan.  All questions answered.   ASA Score: 2    Ready For Surgery From Anesthesia Perspective.     .

## 2024-10-18 NOTE — OP NOTE
OPERATIVE REPORT    DATE OF OPERATION: 10/18/2024    PREOPERATIVE DIAGNOSIS:  right lower Bartholin gland cyst.    POSTOPERATIVE DIAGNOSIS:  right lower Bartholin gland cyst.    OPERATION PERFORMED:  Marsupialization of right lower Bartholin gland abscess.    SURGEON: Andrew Tolentino MD    ASSISTANT: Ambreen Pritchett MD    ANESTHESIA:  General.    ESTIMATED BLOOD LOSS: 20cc    COMPLICATIONS:  None.    INDICATIONS FOR OPERATION AND OPERATIVE FINDINGS: right lower bartholin gland cyst    DESCRIPTION OF OPERATION:  The patient was brought to the operating room in stable condition and placed on the operating room table in the dorsal supine position. The patient was then administered general anesthesia without difficulty. The patient was then repositioned in the dorsal lithotomy position. Exam under anesthesia was performed. A large right lower bartholin gland cyst was noted. The patient was then perineally prepped and draped in the usual sterile fashion.    *** 11 blade, an incision was made in the mucocutaneous junction, approximately 2-3 cm. This allowed the purulent material to exit the cavity. Cultures were taken of this material. Blunt dissection was used to break up loculations. Suction was used to thoroughly evacuate the purulent material. The Bartholin gland cavity was then copiously irrigated with sterile saline. Once this was performed, then marsupialization was accomplished with several interrupted stitches of  3-0 Vicryl.      Sponge and instrument correct was correct x 2. [facility] to the postanesthesia recovery unit in good condition. There were no complications associated with this procedure

## 2024-10-18 NOTE — ANESTHESIA POSTPROCEDURE EVALUATION
Anesthesia Post Evaluation    Patient: Sirena Menendez    Procedure(s) Performed: Procedure(s) (LRB):  MARSUPIALIZATION, CYST, BARTHOLIN'S GLAND (Right)    Final Anesthesia Type: general      Patient location during evaluation: PACU  Patient participation: Yes- Able to Participate  Level of consciousness: awake and alert  Post-procedure vital signs: reviewed and stable  Pain management: adequate  Airway patency: patent    PONV status at discharge: No PONV  Anesthetic complications: no      Cardiovascular status: blood pressure returned to baseline  Respiratory status: unassisted  Hydration status: euvolemic  Follow-up not needed.              Vitals Value Taken Time   /67 10/18/24 0900   Temp 37 °C (98.6 °F) 10/18/24 0900   Pulse 56 10/18/24 0900   Resp 16 10/18/24 0900   SpO2 100 % 10/18/24 0900         Event Time   Out of Recovery 08:57:13         Pain/Leesa Score: Pain Rating Prior to Med Admin: 6 (10/18/2024  8:24 AM)  Pain Rating Post Med Admin: 4 (10/18/2024  8:50 AM)  Leesa Score: 10 (10/18/2024  9:00 AM)

## 2024-10-18 NOTE — INTERVAL H&P NOTE
The patient has been examined and the H&P has been reviewed:    I concur with the findings and no changes have occurred since H&P was written.    Surgery risks, benefits and alternative options discussed and understood by patient/family.    Sirena Menendez is 24 y.o.  presenting for scheduled marsupialization of Bartholin's cyst.    Temp:  [98.3 °F (36.8 °C)] 98.3 °F (36.8 °C)  Pulse:  [71] 71  Resp:  [18] 18  SpO2:  [100 %] 100 %  BP: (110)/(60) 110/60    General: NAD, alert, oriented, cooperative  HEENT: NCAT, EOM grossly intact  Lungs: Normal WOB  Heart: regular rate  Abdomen: soft, nondistended, nontender, no rebound or guarding    Consents in chart. Pre-operative Lovenox given. All questions answered and concerns addressed. To OR for planned procedure.    Ambreen Pritchett MD  Obstetrics & Gynecology, PGY-1

## 2024-10-21 NOTE — OP NOTE
Patient: Sirena Menendez    Medical Record: [id number]    Date of Procedure: 10/18/2024     Surgeon: Surgeons and Role:     * Andrew Tolentino MD - Primary     * Ambreen Pritchett MD - Assisting        Pre-Operative Diagnosis: Bartholin cyst [N75.0]    Post-Operative Diagnosis: Post-Op Diagnosis Codes:     * Bartholin cyst [N75.0]    Procedure: Procedure(s) (LRB):  MARSUPIALIZATION, CYST, BARTHOLIN'S GLAND (Right)    Procedure in Detail:  After appropriate consents were signed the patient was taken to the operating room and placed supine on the operating table.  She underwent anesthesia and was placed in a dorsal lithotomy position sterile prep and drape was performed.  Time-out was performed.  She had a right Bartholin cyst which had previously ruptured recently.  Using the same incision the edges were freshened up with a knife in evaluation at dissection was taken down removing as much of the cyst cavity as possible, the track when approximately 4 cm posterior to the incision upon dissecting down because of the small track a Gelpi retractor was used however did puncture hole in the vagina which was repaired with a 3-0 chromic stitch.  After ligating and removing as much of the sac is possible a deep suture was placed for hemostasis with 2-0 Vicryl.  Next a baseball stitch was done using 3-0 chromic in a running interlocking fashion circumferentially around the opening of the cyst cavity, and incorporating the skin.  Upon completing the procedure no active bleeding was noted the vagina was reinspected and found to be hemostatic procedure was terminated the patient was revived from anesthesia and transferred to recovery in good condition all counts correct x2

## 2024-10-25 LAB
FINAL PATHOLOGIC DIAGNOSIS: NORMAL
GROSS: NORMAL
Lab: NORMAL

## 2024-10-30 ENCOUNTER — PATIENT MESSAGE (OUTPATIENT)
Dept: OBSTETRICS AND GYNECOLOGY | Facility: CLINIC | Age: 24
End: 2024-10-30
Payer: COMMERCIAL

## 2024-12-05 ENCOUNTER — OFFICE VISIT (OUTPATIENT)
Dept: OBSTETRICS AND GYNECOLOGY | Facility: CLINIC | Age: 24
End: 2024-12-05
Payer: COMMERCIAL

## 2024-12-05 VITALS
BODY MASS INDEX: 25.33 KG/M2 | SYSTOLIC BLOOD PRESSURE: 104 MMHG | DIASTOLIC BLOOD PRESSURE: 68 MMHG | WEIGHT: 157.63 LBS | HEIGHT: 66 IN

## 2024-12-05 DIAGNOSIS — Z09 POSTOP CHECK: Primary | ICD-10-CM

## 2024-12-05 PROCEDURE — 99999 PR PBB SHADOW E&M-EST. PATIENT-LVL III: CPT | Mod: PBBFAC,,, | Performed by: STUDENT IN AN ORGANIZED HEALTH CARE EDUCATION/TRAINING PROGRAM

## 2024-12-05 PROCEDURE — 3078F DIAST BP <80 MM HG: CPT | Mod: CPTII,S$GLB,, | Performed by: STUDENT IN AN ORGANIZED HEALTH CARE EDUCATION/TRAINING PROGRAM

## 2024-12-05 PROCEDURE — 99024 POSTOP FOLLOW-UP VISIT: CPT | Mod: S$GLB,,, | Performed by: STUDENT IN AN ORGANIZED HEALTH CARE EDUCATION/TRAINING PROGRAM

## 2024-12-05 PROCEDURE — 1159F MED LIST DOCD IN RCRD: CPT | Mod: CPTII,S$GLB,, | Performed by: STUDENT IN AN ORGANIZED HEALTH CARE EDUCATION/TRAINING PROGRAM

## 2024-12-05 PROCEDURE — 3074F SYST BP LT 130 MM HG: CPT | Mod: CPTII,S$GLB,, | Performed by: STUDENT IN AN ORGANIZED HEALTH CARE EDUCATION/TRAINING PROGRAM

## 2024-12-05 NOTE — PROGRESS NOTES
"CC: Postop visit    Sirena Mneendez is a 24 y.o. female  post-op from a Bartholin gland marsupialization with Dr. Tolentino on 10/18/2024.  Patient is doing well postoperatively.  No pain.  No bowel or bladder complaints.  No fever.      Pathology:   Final Pathologic Diagnosis BARTHOLIN GLAND CYST, EXCISION:  - Features compatible with inflamed Bartholin gland cyst.  - Negative for malignancy.       Past Medical History:   Diagnosis Date    Factor V deficiency     History of blood clots     hx of factor V deficiency     Past Surgical History:   Procedure Laterality Date    CHOLECYSTECTOMY      MARSUPIALIZATION OF BARTHOLIN'S GLAND CYST Right 10/18/2024    Procedure: MARSUPIALIZATION, CYST, BARTHOLIN'S GLAND;  Surgeon: Andrew Tolentino MD;  Location: [facility];  Service: OB/GYN;  Laterality: Right;    WISDOM TOOTH EXTRACTION       Family History   Problem Relation Name Age of Onset    Breast cancer Maternal Grandmother      Skin cancer Maternal Grandfather      Heart disease Father Aly         Arrhythmia    Deep vein thrombosis Father Aly     Heart disease Mother Jody         Septal defect, needed surgery    No Known Problems Brother 1     Thyroid disease Sister 1     Colon cancer Neg Hx      Ovarian cancer Neg Hx       Social History     Tobacco Use    Smoking status: Never    Smokeless tobacco: Never   Substance Use Topics    Alcohol use: No    Drug use: Never     OB History    Para Term  AB Living   0 0 0 0 0 0   SAB IAB Ectopic Multiple Live Births   0 0 0 0 0       /68   Ht 5' 6" (1.676 m)   Wt 71.5 kg (157 lb 10.1 oz)   LMP 2024 (Approximate)   BMI 25.44 kg/m²     ROS:  GENERAL: No fever, chills, fatigability or weight loss.  VULVAR: No pain, no lesions and no itching.  VAGINAL: No relaxation, no itching, no discharge, no abnormal bleeding and no lesions.  ABDOMEN: No abdominal pain. Denies nausea. Denies vomiting. No diarrhea. No constipation  BREAST: Denies pain. No " lumps. No discharge.  URINARY: No incontinence, no nocturia, no frequency and no dysuria.  CARDIOVASCULAR: No chest pain. No shortness of breath. No leg cramps.  NEUROLOGICAL: No headaches. No vision changes.    PE:   GEN:  NAD, A&O x 3  ABDOMEN:  Soft, non-tender, non-distended.  PELVIC: Normal external genitalia without lesions.  Normal hair distribution.  Adequate perineal body, normal urethral meatus.  Vulva well healed. Vagina moist and well rugated without lesions or discharge.          ICD-10-CM ICD-9-CM    1. Postop check  Z09 V67.00             Patient doing well.  Routine follow up.      Farhana Aragon MD

## 2025-04-16 ENCOUNTER — OFFICE VISIT (OUTPATIENT)
Dept: FAMILY MEDICINE | Facility: CLINIC | Age: 25
End: 2025-04-16
Payer: COMMERCIAL

## 2025-04-16 ENCOUNTER — CLINICAL SUPPORT (OUTPATIENT)
Dept: LAB | Facility: HOSPITAL | Age: 25
End: 2025-04-16
Payer: COMMERCIAL

## 2025-04-16 VITALS
HEIGHT: 66 IN | SYSTOLIC BLOOD PRESSURE: 104 MMHG | BODY MASS INDEX: 25.68 KG/M2 | WEIGHT: 159.81 LBS | DIASTOLIC BLOOD PRESSURE: 70 MMHG | TEMPERATURE: 98 F

## 2025-04-16 DIAGNOSIS — I82.512 CHRONIC EMBOLISM AND THROMBOSIS OF LEFT FEMORAL VEIN: ICD-10-CM

## 2025-04-16 DIAGNOSIS — Z13.21 ENCOUNTER FOR VITAMIN DEFICIENCY SCREENING: ICD-10-CM

## 2025-04-16 DIAGNOSIS — D68.2 FACTOR V DEFICIENCY: ICD-10-CM

## 2025-04-16 DIAGNOSIS — Z13.6 SCREENING FOR CARDIOVASCULAR CONDITION: ICD-10-CM

## 2025-04-16 DIAGNOSIS — Z00.00 ROUTINE GENERAL MEDICAL EXAMINATION AT HEALTH CARE FACILITY: Primary | ICD-10-CM

## 2025-04-16 DIAGNOSIS — D64.9 ANEMIA, UNSPECIFIED TYPE: ICD-10-CM

## 2025-04-16 DIAGNOSIS — F50.00 ANOREXIA NERVOSA: ICD-10-CM

## 2025-04-16 PROCEDURE — 93010 ELECTROCARDIOGRAM REPORT: CPT | Mod: ,,, | Performed by: INTERNAL MEDICINE

## 2025-04-16 PROCEDURE — 93005 ELECTROCARDIOGRAM TRACING: CPT

## 2025-04-16 PROCEDURE — 99999 PR PBB SHADOW E&M-EST. PATIENT-LVL V: CPT | Mod: PBBFAC,,,

## 2025-04-16 NOTE — PROGRESS NOTES
Subjective     Patient ID: Sirena Menendez is a 25 y.o. female.    Chief Complaint: Annual Exam (Discuss eating disorder)      Patient is a pleasant 25 year old female with Factor V deficiency, chronic DVT of left femoral vein (on Xarelto), eating disorder that presents to clinic alone today as a known patient to me, not established with a PCP, for her annual physical exam. States she is doing well overall. Sees an outside therapist for her eating disorder, anorexia nervosa, and doing well with that. Has been consistently steady in her weight. Does states she sometimes has the urge to purge still but it comes and goes. Denies any fatigue, chest pain, SOB, abdominal pain, n/v/d. States she does notice she has to go to the bathroom almost as soon as she finishes eating something. She works at [employer] in the [employer]. States she was also discharged from the provider managing her DVT and was told to just have her PCP manage it from now on. Denies any acute pain or swelling in left leg. Patient is agreeable to EC with new PCP.     Health Maintenance reviewed -    Flu- UTD in 11/2024  Covid- decline today  Tetanus- decline today- last in 2010  Cervical cancer screening-  never done; will schedule appt. with her OB/GYN      Review of Systems   Constitutional:  Negative for activity change, fatigue and unexpected weight change.   HENT:  Negative for hearing loss, rhinorrhea and trouble swallowing.    Eyes:  Negative for discharge and visual disturbance.   Respiratory:  Negative for chest tightness, shortness of breath and wheezing.    Cardiovascular:  Negative for chest pain and palpitations.   Gastrointestinal:  Negative for blood in stool, constipation, diarrhea and vomiting.   Endocrine: Negative for polydipsia and polyuria.   Genitourinary:  Negative for difficulty urinating, dysuria, hematuria and menstrual problem.   Musculoskeletal:  Negative for arthralgias, joint swelling and neck pain.    Integumentary:  Negative for rash.   Neurological:  Negative for weakness and headaches.   Psychiatric/Behavioral:  Negative for confusion and dysphoric mood.      Past Medical History:   Diagnosis Date    Factor V deficiency     History of blood clots     hx of factor V deficiency       Past Surgical History:   Procedure Laterality Date    CHOLECYSTECTOMY      MARSUPIALIZATION OF BARTHOLIN'S GLAND CYST Right 10/18/2024    Procedure: MARSUPIALIZATION, CYST, BARTHOLIN'S GLAND;  Surgeon: Andrew Tolentino MD;  Location: [facility];  Service: OB/GYN;  Laterality: Right;    WISDOM TOOTH EXTRACTION         Family History   Problem Relation Name Age of Onset    Breast cancer Maternal Grandmother      Skin cancer Maternal Grandfather      Heart disease Father Aly         Arrhythmia    Deep vein thrombosis Father Aly     Heart disease Mother Jody         Septal defect, needed surgery    No Known Problems Brother 1     Thyroid disease Sister 1     Colon cancer Neg Hx      Ovarian cancer Neg Hx         Social History     Socioeconomic History    Marital status: Single   Tobacco Use    Smoking status: Never    Smokeless tobacco: Never   Substance and Sexual Activity    Alcohol use: No    Drug use: Never    Sexual activity: Not Currently     Social Drivers of Health     Financial Resource Strain: Low Risk  (4/16/2025)    Overall Financial Resource Strain (CARDIA)     Difficulty of Paying Living Expenses: Not very hard   Food Insecurity: No Food Insecurity (4/16/2025)    Hunger Vital Sign     Worried About Running Out of Food in the Last Year: Never true     Ran Out of Food in the Last Year: Never true   Transportation Needs: No Transportation Needs (4/16/2025)    PRAPARE - Transportation     Lack of Transportation (Medical): No     Lack of Transportation (Non-Medical): No   Physical Activity: Insufficiently Active (4/16/2025)    Exercise Vital Sign     Days of Exercise per Week: 2 days     Minutes of Exercise per Session:  "50 min   Stress: No Stress Concern Present (4/16/2025)    [employer] [place] [employer] - Occupational Stress Questionnaire     Feeling of Stress : Only a little   Housing Stability: Low Risk  (4/16/2025)    Housing Stability Vital Sign     Unable to Pay for Housing in the Last Year: No     Number of Times Moved in the Last Year: 0     Homeless in the Last Year: No       Current Medications[1]    Review of patient's allergies indicates:  No Known Allergies      Objective     Vitals:    04/16/25 1040   BP: 104/70   Temp: 98.2 °F (36.8 °C)   TempSrc: Oral   Weight: 72.5 kg (159 lb 13.3 oz)   Height: 5' 6" (1.676 m)   PainSc: 0-No pain      Physical Exam  Vitals and nursing note reviewed.   Constitutional:       General: She is not in acute distress.     Appearance: Normal appearance. She is not ill-appearing.   HENT:      Head: Normocephalic and atraumatic.      Right Ear: Tympanic membrane normal.      Left Ear: Tympanic membrane normal.      Nose: Nose normal.      Mouth/Throat:      Mouth: Mucous membranes are moist.      Pharynx: Oropharynx is clear.   Eyes:      General: No scleral icterus.        Right eye: No discharge.         Left eye: No discharge.      Extraocular Movements: Extraocular movements intact.      Conjunctiva/sclera: Conjunctivae normal.   Cardiovascular:      Rate and Rhythm: Normal rate and regular rhythm.      Pulses: Normal pulses.      Heart sounds: Normal heart sounds. No murmur heard.  Pulmonary:      Effort: Pulmonary effort is normal. No respiratory distress.   Abdominal:      General: Abdomen is flat. Bowel sounds are normal. There is no distension.      Palpations: Abdomen is soft. There is no mass.      Tenderness: There is no abdominal tenderness.   Musculoskeletal:         General: Normal range of motion.      Cervical back: Normal range of motion and neck supple.      Right lower leg: No edema.      Left lower leg: No edema.   Skin:     General: Skin is warm and dry.    "   Findings: No rash.   Neurological:      General: No focal deficit present.      Mental Status: She is alert and oriented to person, place, and time.      GCS: GCS eye subscore is 4. GCS verbal subscore is 5. GCS motor subscore is 6.   Psychiatric:         Mood and Affect: Mood normal.         Speech: Speech normal.         Behavior: Behavior normal. Behavior is cooperative.         Cognition and Memory: Cognition normal.          Assessment and Plan     1. Routine general medical examination at health care facility  - Annual physical exam with requisition for labs; not fasting today; labs ordered and will schedule.   -     Hepatitis C antibody; Future; Expected date: 04/16/2025  -     HIV 1/2 Ag/Ab (4th Gen); Future; Expected date: 04/16/2025  -     CBC Auto Differential; Future; Expected date: 04/16/2025  -     Lipid Panel; Future; Expected date: 04/16/2025  -     Comprehensive Metabolic Panel; Future; Expected date: 04/16/2025  -     TSH; Future; Expected date: 04/16/2025  -     Hemoglobin A1C; Future; Expected date: 04/16/2025    2. Factor V deficiency  - Chronic; labs ordered; will reach out with abnormal results.   -     Type & Screen; Future; Expected date: 04/16/2025  -     Vitamin D; Future; Expected date: 04/16/2025  -     Ferritin; Future; Expected date: 04/16/2025  -     Folate; Future; Expected date: 04/16/2025  -     Iron and TIBC; Future; Expected date: 04/16/2025  -     Vitamin B12; Future; Expected date: 04/16/2025    3. Anorexia nervosa  - Chronic, doing well- seen by outside therapist; recommended to her by her therapist to be seen by psychiatry for further management with CBT or medication; referral placed.   -     Vitamin D; Future; Expected date: 04/16/2025  -     Ferritin; Future; Expected date: 04/16/2025  -     Folate; Future; Expected date: 04/16/2025  -     Iron and TIBC; Future; Expected date: 04/16/2025  -     Vitamin B12; Future; Expected date: 04/16/2025  -     Cancel: Ambulatory  referral/consult to Psychiatry; Future; Expected date: 04/23/2025  -     Ambulatory referral/consult to Psychiatry; Future; Expected date: 04/23/2025    4. 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 Order placed for u/s.   -     US Lower Extremity Veins Bilateral; Future; Expected date: 04/23/2025    5. Anemia, unspecified type  - Chronic, stable; labs ordered  -     Type & Screen; Future; Expected date: 04/16/2025  -     Vitamin D; Future; Expected date: 04/16/2025  -     Ferritin; Future; Expected date: 04/16/2025  -     Folate; Future; Expected date: 04/16/2025  -     Iron and TIBC; Future; Expected date: 04/16/2025  -     Vitamin B12; Future; Expected date: 04/16/2025    6. Encounter for vitamin deficiency screening  - Chronic, stable; labs ordered  -     Vitamin D; Future; Expected date: 04/16/2025  -     Ferritin; Future; Expected date: 04/16/2025  -     Folate; Future; Expected date: 04/16/2025  -     Iron and TIBC; Future; Expected date: 04/16/2025  -     Vitamin B12; Future; Expected date: 04/16/2025    7. Screening for cardiovascular condition  - EKG ordered to rule out CV disease in relation to eating disorder. Denies any CP or SOB. Requesting EKG.   -     SCHEDULED EKG 12-LEAD (to Muse); Future; Expected date: 04/16/2025       Follow up if symptoms worsen or fail to improve. Encouraged pt to establish care with new PCP.    45 minutes of total time spent on the encounter, which includes face to face time and non-face to face time preparing to see the patient (eg, review of tests), Obtaining and/or reviewing separately obtained history, Documenting clinical information in the electronic or other health record, Independently interpreting results (not separately reported) and communicating results to the patient/family/caregiver, or Care coordination (not separately reported).      Caty Mora, MSN, APRN, FNP-C  Family  Medicine  Office #[phone]          [1]   Current Outpatient Medications   Medication Sig Dispense Refill    rivaroxaban (XARELTO) 10 mg Tab Take 10 mg by mouth daily with dinner or evening meal.       No current facility-administered medications for this visit.      Consent (Spinal Accessory)/Introductory Paragraph: The rationale for Mohs was explained to the patient and consent was obtained. The risks, benefits and alternatives to therapy were discussed in detail. Specifically, the risks of damage to the spinal accessory nerve, infection, scarring, bleeding, prolonged wound healing, incomplete removal, allergy to anesthesia, and recurrence were addressed. Prior to the procedure, the treatment site was clearly identified and confirmed by the patient. All components of Universal Protocol/PAUSE Rule completed.

## 2025-04-17 LAB
OHS QRS DURATION: 76 MS
OHS QTC CALCULATION: 408 MS

## 2025-04-20 ENCOUNTER — RESULTS FOLLOW-UP (OUTPATIENT)
Dept: FAMILY MEDICINE | Facility: CLINIC | Age: 25
End: 2025-04-20

## 2025-04-20 DIAGNOSIS — E55.9 VITAMIN D INSUFFICIENCY: Primary | ICD-10-CM

## 2025-04-21 ENCOUNTER — LAB VISIT (OUTPATIENT)
Dept: LAB | Facility: HOSPITAL | Age: 25
End: 2025-04-21
Payer: COMMERCIAL

## 2025-04-21 DIAGNOSIS — F50.00 ANOREXIA NERVOSA: ICD-10-CM

## 2025-04-21 DIAGNOSIS — D64.9 ANEMIA, UNSPECIFIED TYPE: ICD-10-CM

## 2025-04-21 DIAGNOSIS — Z13.21 ENCOUNTER FOR VITAMIN DEFICIENCY SCREENING: ICD-10-CM

## 2025-04-21 DIAGNOSIS — D68.2 FACTOR V DEFICIENCY: ICD-10-CM

## 2025-04-21 DIAGNOSIS — Z00.00 ROUTINE GENERAL MEDICAL EXAMINATION AT HEALTH CARE FACILITY: ICD-10-CM

## 2025-04-21 LAB
25(OH)D3+25(OH)D2 SERPL-MCNC: 12 NG/ML (ref 30–96)
ABSOLUTE EOSINOPHIL (OHS): 0.12 K/UL
ABSOLUTE MONOCYTE (OHS): 0.36 K/UL (ref 0.3–1)
ABSOLUTE NEUTROPHIL COUNT (OHS): 2.62 K/UL (ref 1.8–7.7)
ALBUMIN SERPL BCP-MCNC: 4.1 G/DL (ref 3.5–5.2)
ALP SERPL-CCNC: 38 UNIT/L (ref 40–150)
ALT SERPL W/O P-5'-P-CCNC: 12 UNIT/L (ref 10–44)
ANION GAP (OHS): 8 MMOL/L (ref 8–16)
AST SERPL-CCNC: 10 UNIT/L (ref 11–45)
BASOPHILS # BLD AUTO: 0.04 K/UL
BASOPHILS NFR BLD AUTO: 0.8 %
BILIRUB SERPL-MCNC: 0.4 MG/DL (ref 0.1–1)
BUN SERPL-MCNC: 17 MG/DL (ref 6–20)
CALCIUM SERPL-MCNC: 9 MG/DL (ref 8.7–10.5)
CHLORIDE SERPL-SCNC: 106 MMOL/L (ref 95–110)
CHOLEST SERPL-MCNC: 152 MG/DL (ref 120–199)
CHOLEST/HDLC SERPL: 2.9 {RATIO} (ref 2–5)
CO2 SERPL-SCNC: 23 MMOL/L (ref 23–29)
CREAT SERPL-MCNC: 0.9 MG/DL (ref 0.5–1.4)
EAG (OHS): 88 MG/DL (ref 68–131)
ERYTHROCYTE [DISTWIDTH] IN BLOOD BY AUTOMATED COUNT: 12.2 % (ref 11.5–14.5)
FERRITIN SERPL-MCNC: 42 NG/ML (ref 20–300)
FOLATE SERPL-MCNC: 3.8 NG/ML (ref 4–24)
GFR SERPLBLD CREATININE-BSD FMLA CKD-EPI: >60 ML/MIN/1.73/M2
GLUCOSE SERPL-MCNC: 81 MG/DL (ref 70–110)
HBA1C MFR BLD: 4.7 % (ref 4–5.6)
HCT VFR BLD AUTO: 41.8 % (ref 37–48.5)
HCV AB SERPL QL IA: NORMAL
HDLC SERPL-MCNC: 52 MG/DL (ref 40–75)
HDLC SERPL: 34.2 % (ref 20–50)
HGB BLD-MCNC: 13.5 GM/DL (ref 12–16)
HIV 1+2 AB+HIV1 P24 AG SERPL QL IA: NORMAL
IMM GRANULOCYTES # BLD AUTO: 0.01 K/UL (ref 0–0.04)
IMM GRANULOCYTES NFR BLD AUTO: 0.2 % (ref 0–0.5)
INDIRECT COOMBS: NORMAL
IRON SATN MFR SERPL: 28 % (ref 20–50)
IRON SERPL-MCNC: 84 UG/DL (ref 30–160)
LDLC SERPL CALC-MCNC: 91.2 MG/DL (ref 63–159)
LYMPHOCYTES # BLD AUTO: 2 K/UL (ref 1–4.8)
MCH RBC QN AUTO: 28 PG (ref 27–31)
MCHC RBC AUTO-ENTMCNC: 32.3 G/DL (ref 32–36)
MCV RBC AUTO: 87 FL (ref 82–98)
NONHDLC SERPL-MCNC: 100 MG/DL
NUCLEATED RBC (/100WBC) (OHS): 0 /100 WBC
PLATELET # BLD AUTO: 251 K/UL (ref 150–450)
PMV BLD AUTO: 9.7 FL (ref 9.2–12.9)
POTASSIUM SERPL-SCNC: 3.9 MMOL/L (ref 3.5–5.1)
PROT SERPL-MCNC: 7.4 GM/DL (ref 6–8.4)
RBC # BLD AUTO: 4.82 M/UL (ref 4–5.4)
RELATIVE EOSINOPHIL (OHS): 2.3 %
RELATIVE LYMPHOCYTE (OHS): 38.8 % (ref 18–48)
RELATIVE MONOCYTE (OHS): 7 % (ref 4–15)
RELATIVE NEUTROPHIL (OHS): 50.9 % (ref 38–73)
RH BLD: NORMAL
SODIUM SERPL-SCNC: 137 MMOL/L (ref 136–145)
SPECIMEN OUTDATE: NORMAL
TIBC SERPL-MCNC: 303 UG/DL (ref 250–450)
TRANSFERRIN SERPL-MCNC: 205 MG/DL (ref 200–375)
TRIGL SERPL-MCNC: 44 MG/DL (ref 30–150)
TSH SERPL-ACNC: 1.35 UIU/ML (ref 0.4–4)
VIT B12 SERPL-MCNC: 330 PG/ML (ref 210–950)
WBC # BLD AUTO: 5.15 K/UL (ref 3.9–12.7)

## 2025-04-21 PROCEDURE — 82607 VITAMIN B-12: CPT

## 2025-04-21 PROCEDURE — 83036 HEMOGLOBIN GLYCOSYLATED A1C: CPT

## 2025-04-21 PROCEDURE — 80053 COMPREHEN METABOLIC PANEL: CPT

## 2025-04-21 PROCEDURE — 84466 ASSAY OF TRANSFERRIN: CPT

## 2025-04-21 PROCEDURE — 80061 LIPID PANEL: CPT

## 2025-04-21 PROCEDURE — 82746 ASSAY OF FOLIC ACID SERUM: CPT

## 2025-04-21 PROCEDURE — 85025 COMPLETE CBC W/AUTO DIFF WBC: CPT

## 2025-04-21 PROCEDURE — 82728 ASSAY OF FERRITIN: CPT

## 2025-04-21 PROCEDURE — 84443 ASSAY THYROID STIM HORMONE: CPT

## 2025-04-21 PROCEDURE — 82306 VITAMIN D 25 HYDROXY: CPT

## 2025-04-21 PROCEDURE — 87389 HIV-1 AG W/HIV-1&-2 AB AG IA: CPT

## 2025-04-21 PROCEDURE — 36415 COLL VENOUS BLD VENIPUNCTURE: CPT

## 2025-04-21 PROCEDURE — 86803 HEPATITIS C AB TEST: CPT

## 2025-04-21 PROCEDURE — 86900 BLOOD TYPING SEROLOGIC ABO: CPT

## 2025-04-23 ENCOUNTER — HOSPITAL ENCOUNTER (OUTPATIENT)
Dept: RADIOLOGY | Facility: HOSPITAL | Age: 25
Discharge: HOME | End: 2025-04-23
Payer: COMMERCIAL

## 2025-04-23 DIAGNOSIS — I82.512 CHRONIC EMBOLISM AND THROMBOSIS OF LEFT FEMORAL VEIN: ICD-10-CM

## 2025-04-23 PROCEDURE — 93970 EXTREMITY STUDY: CPT | Mod: TC

## 2025-04-23 PROCEDURE — 93970 EXTREMITY STUDY: CPT | Mod: 26,,, | Performed by: INTERNAL MEDICINE

## 2025-04-23 RX ORDER — ERGOCALCIFEROL 1.25 MG/1
50000 CAPSULE ORAL
Qty: 12 CAPSULE | Refills: 3 | Status: SHIPPED | OUTPATIENT
Start: 2025-04-23 | End: 2026-03-25

## (undated) DEVICE — SUT CHROMIC 2-0 SH 27IN BRN

## (undated) DEVICE — SUT VICRYL CTD 2-0 GI 27 SH

## (undated) DEVICE — TUBING SUC UNIV W/CONN 12FT

## (undated) DEVICE — SOL IRR SOD CHL .9% POUR

## (undated) DEVICE — TIP YANKAUERS BULB NO VENT

## (undated) DEVICE — Device

## (undated) DEVICE — GLOVE SENSICARE PI MICRO 7.5

## (undated) DEVICE — SOL POVIDONE SCRUB IODINE 4 OZ

## (undated) DEVICE — ELECTRODE REM PLYHSV RETURN 9

## (undated) DEVICE — SYR IRRIGATION BULB STER 60ML

## (undated) DEVICE — SOL POVIDONE PREP IODINE 4 OZ

## (undated) DEVICE — TRAY DRY SKIN SCRUB PREP

## (undated) DEVICE — PENCIL ELECTROSURG HOLST W/BLD

## (undated) DEVICE — SUT VICRYL 3-0 27 SH

## (undated) DEVICE — SUT VICRYL 4-0 27 SH

## (undated) DEVICE — SUT CHROMIC 3-0 SH 27IN GUT